# Patient Record
Sex: MALE | Race: WHITE | NOT HISPANIC OR LATINO | Employment: PART TIME | URBAN - NONMETROPOLITAN AREA
[De-identification: names, ages, dates, MRNs, and addresses within clinical notes are randomized per-mention and may not be internally consistent; named-entity substitution may affect disease eponyms.]

---
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
## 2023-02-10 ENCOUNTER — HOSPITAL ENCOUNTER (EMERGENCY)
Facility: HOSPITAL | Age: 23
Discharge: HOME | End: 2023-02-10
Attending: STUDENT IN AN ORGANIZED HEALTH CARE EDUCATION/TRAINING PROGRAM | Admitting: STUDENT IN AN ORGANIZED HEALTH CARE EDUCATION/TRAINING PROGRAM
Payer: COMMERCIAL

## 2023-02-10 ENCOUNTER — APPOINTMENT (OUTPATIENT)
Dept: CT IMAGING | Facility: HOSPITAL | Age: 23
End: 2023-02-10
Payer: COMMERCIAL

## 2023-02-10 VITALS
BODY MASS INDEX: 27.98 KG/M2 | HEIGHT: 77 IN | WEIGHT: 237 LBS | RESPIRATION RATE: 15 BRPM | DIASTOLIC BLOOD PRESSURE: 97 MMHG | SYSTOLIC BLOOD PRESSURE: 138 MMHG | OXYGEN SATURATION: 99 % | HEART RATE: 77 BPM | TEMPERATURE: 98.4 F

## 2023-02-10 DIAGNOSIS — M54.9 BACK PAIN, UNSPECIFIED BACK LOCATION, UNSPECIFIED BACK PAIN LATERALITY, UNSPECIFIED CHRONICITY: Primary | ICD-10-CM

## 2023-02-10 PROCEDURE — 96372 THER/PROPH/DIAG INJ SC/IM: CPT

## 2023-02-10 PROCEDURE — 99283 EMERGENCY DEPT VISIT LOW MDM: CPT

## 2023-02-10 PROCEDURE — 72128 CT CHEST SPINE W/O DYE: CPT

## 2023-02-10 PROCEDURE — 25010000002 KETOROLAC TROMETHAMINE PER 15 MG

## 2023-02-10 PROCEDURE — 72131 CT LUMBAR SPINE W/O DYE: CPT

## 2023-02-10 RX ORDER — KETOROLAC TROMETHAMINE 30 MG/ML
15 INJECTION, SOLUTION INTRAMUSCULAR; INTRAVENOUS ONCE
Status: COMPLETED | OUTPATIENT
Start: 2023-02-10 | End: 2023-02-10

## 2023-02-10 RX ORDER — METHOCARBAMOL 500 MG/1
750 TABLET, FILM COATED ORAL ONCE
Status: COMPLETED | OUTPATIENT
Start: 2023-02-10 | End: 2023-02-10

## 2023-02-10 RX ORDER — METHOCARBAMOL 500 MG/1
500 TABLET, FILM COATED ORAL 4 TIMES DAILY
Qty: 16 TABLET | Refills: 0 | Status: SHIPPED | OUTPATIENT
Start: 2023-02-10

## 2023-02-10 RX ORDER — LIDOCAINE 50 MG/G
1 PATCH TOPICAL EVERY 24 HOURS
Qty: 12 PATCH | Refills: 0 | Status: SHIPPED | OUTPATIENT
Start: 2023-02-10

## 2023-02-10 RX ORDER — LIDOCAINE 50 MG/G
1 PATCH TOPICAL ONCE
Status: DISCONTINUED | OUTPATIENT
Start: 2023-02-10 | End: 2023-02-10 | Stop reason: HOSPADM

## 2023-02-10 RX ADMIN — LIDOCAINE 1 PATCH: 50 PATCH CUTANEOUS at 11:56

## 2023-02-10 RX ADMIN — KETOROLAC TROMETHAMINE 15 MG: 30 INJECTION, SOLUTION INTRAMUSCULAR; INTRAVENOUS at 11:56

## 2023-02-10 RX ADMIN — METHOCARBAMOL 750 MG: 500 TABLET ORAL at 11:56

## 2023-02-10 NOTE — DISCHARGE INSTRUCTIONS
Return to emergency room for any worsening symptoms, I have sent lidocaine patches and muscle relaxer to your pharmacy, make sure to take these as directed and as needed only.  Utilize anti-inflammatory therapy such as naproxen or ibuprofen in addition to Tylenol for further pain.  Try to continue your daily routines as normal.  Follow-up with primary care, have also included a referral to orthopedics for further evaluation, call the number to schedule an appointment.

## 2023-02-10 NOTE — ED PROVIDER NOTES
"Subjective  History of Present Illness:  22-year-old athletic male presents emergency room today for chief complaint of back pain.  Patient reports that he was in the weight room dead lifting when his back \"gave out\".  Patient reports that he was dead lifting 135 pounds straight leg when his back gave out, he went to the floor and was unable to get back up.  Reports pain is worse with sitting.  He did ambulate into the emergency room.  Describes pain as mid to lower back and midline.  Nonradiating.  No numbness or tingling.  Denies any urinary retention, urinary or fecal incontinence, or saddle anesthesias.  This occurred approximately 30 minutes prior to arrival.  Describes the pain as a pressure-like sensation and rates as a 10 out of 10, worse with movement or ambulation.      Nurses Notes reviewed and agree, including vitals, allergies, social history and prior medical history.     REVIEW OF SYSTEMS: All systems reviewed and not pertinent unless noted.  Review of Systems   Musculoskeletal: Positive for back pain.   All other systems reviewed and are negative.      No past medical history on file.    Allergies:    Patient has no known allergies.      No past surgical history on file.      Social History     Socioeconomic History   • Marital status: Single   Tobacco Use   • Smoking status: Unknown         No family history on file.    Objective  Physical Exam:  /96   Pulse 101   Temp 98.4 °F (36.9 °C)   Resp 18   Ht 195.6 cm (77\")   Wt 108 kg (237 lb)   SpO2 96%   BMI 28.10 kg/m²      Physical Exam  Vitals and nursing note reviewed.   Constitutional:       General: He is not in acute distress.     Appearance: Normal appearance. He is normal weight. He is not ill-appearing, toxic-appearing or diaphoretic.   HENT:      Head: Normocephalic and atraumatic.      Nose: Nose normal. No congestion or rhinorrhea.      Mouth/Throat:      Pharynx: Oropharynx is clear.   Eyes:      Extraocular Movements: " "Extraocular movements intact.   Cardiovascular:      Pulses: Normal pulses.      Comments: Appears well perfused  Pulmonary:      Effort: Pulmonary effort is normal. No respiratory distress.   Musculoskeletal:         General: Tenderness present. No swelling. Normal range of motion.      Cervical back: Normal range of motion and neck supple. No rigidity or tenderness.      Comments: Tenderness palpated to the mid/lower back and midline.  There is additionally some paraspinal tenderness in the lower thoracic/upper lumbar region.  No overlying ecchymosis or skin changes.   Skin:     General: Skin is warm and dry.      Capillary Refill: Capillary refill takes less than 2 seconds.   Neurological:      General: No focal deficit present.      Mental Status: He is alert and oriented to person, place, and time.   Psychiatric:         Mood and Affect: Mood normal.         Behavior: Behavior normal.         Thought Content: Thought content normal.         Judgment: Judgment normal.           Procedures    ED Course:    ED Course as of 02/10/23 1326   Fri Feb 10, 2023   1150 Will obtain CT lumbar spine, CT thoracic spine, give Robaxin, lidocaine patch, and Toradol for symptomatic treatment. [JR]      ED Course User Index  [JR] Arvind García PA-C       Lab Results (last 24 hours)     ** No results found for the last 24 hours. **           CT Thoracic Spine Without Contrast    Result Date: 2/10/2023  PROCEDURE: CT THORACIC SPINE WO CONTRAST-  HISTORY: mid back pain, back \"gave out\" deadlifting  TECHNIQUE: Thin section axial CT with sagittal and coronal reconstructions  FINDINGS: No fracture is present. Alignment is normal. No bony canal stenosis is seen. No obvious disc abnormalities are seen.      Impression: Negative CT evaluation of the thoracic spine for acute bony injury.    This study was performed with techniques to keep radiation doses as low as reasonably achievable (ALARA). Individualized dose reduction techniques " "using automated exposure control or adjustment of vA and/or kV according to the patient size were employed.  This report was signed and finalized on 2/10/2023 12:33 PM by Brendon Connolly MD.    CT Lumbar Spine Without Contrast    Result Date: 2/10/2023  PROCEDURE: CT LUMBAR SPINE WO CONTRAST-  HISTORY:  low back pain, back gave out deadlifting  TECHNIQUE: Thin section axial CT with sagittal and coronal reconstructions  FINDINGS: No fracture is present. Alignment is normal.  There iscanal stenosis L3-4 and more significantly L4-5 due to combination of disc disease and congenitally small bony spinal canal.      Impression: 1. Negative CT evaluation of the lumbar spine for acute bony injury. 2. Canal stenosis L4-5 and to lesser extent L3-4    This study was performed with techniques to keep radiation doses as low as reasonably achievable (ALARA). Individualized dose reduction techniques using automated exposure control or adjustment of vA and/or kV according to the patient size were employed.  This report was signed and finalized on 2/10/2023 12:32 PM by Brendon Connolly MD.         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  Patient reports that he was in the weight room dead lifting when his back \"gave out\".  Patient reports that he was dead lifting 135 pounds straight leg when his back gave out, he went to the floor and was unable to get back up.  Reports pain is worse with sitting.  He did ambulate into the emergency room.  Describes pain as mid to lower back and midline.  Nonradiating.  No numbness or tingling.  Denies any urinary retention, urinary or fecal incontinence, or saddle anesthesias.  This occurred approximately 30 minutes prior to arrival.  Describes the pain as a pressure-like sensation and rates as a 10 out of 10, worse with movement or ambulation.    DDX: includes but is not limited to: Musculoskeletal " strain/sprain, osseous abnormality, fracture, bulging disc    Patient arrives hemodynamically stable, some hypertension at 141/96, afebrile, heart rate of 101, SPO2 96% on room air, 18 respirations a minute, nontoxic-appearing.  Suspect the elevated blood pressure and mild tachycardia is likely a response to pain.  With vitals interpreted by myself.     Pertinent features from physical exam: Pain palpated to the lower thoracic/upper lumbar region of the back and midline, some paraspinal tenderness also palpated in this region.  Patient has full range of motion, was able to ambulate into the emergency room.    Initial diagnostic plan: CT lumbar spine, CT thoracic spine without contrast    Results from initial plan were reviewed and interpreted by me revealing no acute abnormality of CT thoracic or lumbar spine. Negative CT evaluation of the lumbar spine for acute bony injury. Canal stenosis L4-5 and to lesser extent L3-4 as interpreted by radiology    Diagnostic information from other sources: N/A    Interventions / Re-evaluation: Given Toradol, lidocaine patch, and muscle laxer for symptomatic treatment.  Stable for discharge home.    Results/clinical rationale were discussed with patient at bedside.  Discussed supportive care measures such as anti-inflammatory therapy and Robaxin and lidocaine patches for further relief of pain.  Discussed sending referral for orthopedics if needed, patient will schedule appointment.  Discussed primary care follow-up as well.  Discussed negative CT findings with the patient for any acute bony abnormalities, did discuss canal stenosis as interpreted by radiology.  Return precautions discussed.  Discussed with him that in the absence of any bony abnormalities, this was likely the result of musculoskeletal etiology being a muscle strain of the mid to low back.    Consultations/Discussion of results with other physicians: N/A    Disposition plan: Stable for disposition home.  Placed a  referral for orthopedics and primary care follow-up.  Discussed supportive care measures such as anti-inflammatory and muscle relaxer and lidocaine patches in addition to ice and heat alternation as needed.  Return precautions discussed.  -----    Final diagnoses:   Back pain, unspecified back location, unspecified back pain laterality, unspecified chronicity        Arvind García PA-C  02/10/23 2495

## 2025-05-07 LAB
C TRACH RRNA SPEC QL NAA+PROBE: NEGATIVE
N GONORRHOEA RRNA SPEC QL NAA+PROBE: NEGATIVE

## 2025-05-08 PROBLEM — Z00.00 PHYSICAL EXAM: Status: ACTIVE | Noted: 2025-05-08

## 2025-05-08 NOTE — PROGRESS NOTES
Patient Name: Stephon Durham  : 2000   MRN: [id number]     Chief Complaint:    Chief Complaint   Patient presents with    Annual Exam    adjustment disorder        History of Present Illness: Stephon Durham is a 24 y.o. male who is here today for their annual health maintenance and physical.  And to go over abdominal pain and adjustment disorder.         Review of Systems:   Review of Systems   Constitutional: Negative.    HENT: Negative.     Eyes: Negative.    Respiratory: Negative.     Cardiovascular: Negative.    Gastrointestinal: Negative.  Positive for abdominal pain.   Neurological: Negative.        Past Medical History, Social History, Family History and Care Team were all reviewed with patient and updated as appropriate.     Medications:     Current Outpatient Medications:     sertraline (Zoloft) 25 MG tablet, Take 1 tablet by mouth Daily., Disp: 90 tablet, Rfl: 1    Allergies:   No Known Allergies    Health Maintenance   Topic Date Due    COVID-19 Vaccine (2024- season) Never done    HEPATITIS C SCREENING  Never done    INFLUENZA VACCINE  2025    ANNUAL PHYSICAL  2026    TDAP/TD VACCINES (2 - Td or Tdap) 2028    Pneumococcal Vaccine 0-49  Aged Out        PHQ-2 Total Score: 6       Intimate partner violence: (Screen on initial visit, older adult with injury or evidence of neglect):  Violence can be a problem in many people's lives, so I now ask every patient about trauma or abuse they may have experienced in a relationship.  Stress/Safety - Do you feel safe in your relationship?  Afraid/Abused - Have you ever been in a relationship where you were threatened, hurt, or afraid?  Friend/Family - Are your friends aware you have been hurt?  Emergency Plan - Do you have a safe place to go and the resources you need in an emergency?    Osteoporosis:   Men: history of low trauma fracture, androgen deprivation therapy for prostate cancer, hypogonadism, primary hyperparathyroidism,  "intestinal disorders.       Physical Exam:  Vital Signs:   Vitals:    05/12/25 1336   BP: 120/92   BP Location: Left arm   Patient Position: Sitting   Cuff Size: Large Adult   Pulse: 68   SpO2: 98%   Weight: 118 kg (261 lb 1.6 oz)   Height: 195.6 cm (77.01\")   PainSc: 0-No pain     Body mass index is 30.96 kg/m².     Physical Exam  Vitals and nursing note reviewed.   Constitutional:       Appearance: Normal appearance. He is normal weight.   HENT:      Head: Normocephalic and atraumatic.      Right Ear: Tympanic membrane, ear canal and external ear normal.      Left Ear: Tympanic membrane, ear canal and external ear normal.      Nose: Nose normal.      Mouth/Throat:      Mouth: Mucous membranes are moist.      Pharynx: Oropharynx is clear.   Eyes:      Extraocular Movements: Extraocular movements intact.      Conjunctiva/sclera: Conjunctivae normal.      Pupils: Pupils are equal, round, and reactive to light.   Cardiovascular:      Rate and Rhythm: Normal rate and regular rhythm.      Pulses: Normal pulses.      Heart sounds: Normal heart sounds.   Pulmonary:      Effort: Pulmonary effort is normal.      Breath sounds: Normal breath sounds.   Abdominal:      General: Abdomen is flat. Bowel sounds are normal.      Palpations: Abdomen is soft.   Musculoskeletal:         General: Normal range of motion.      Cervical back: Normal range of motion and neck supple.   Feet:      Comments:      Skin:     General: Skin is warm and dry.   Neurological:      General: No focal deficit present.      Mental Status: He is alert and oriented to person, place, and time.   Psychiatric:         Mood and Affect: Mood normal.         Behavior: Behavior normal.         Thought Content: Thought content normal.         Judgment: Judgment normal.         Procedures      Assessment/Plan:   Diagnoses and all orders for this visit:    1. Physical exam (Primary)  Assessment & Plan:  We did health maintenance, diet, exercise, and " immunizations.    Orders:  -     Comprehensive Metabolic Panel; Future  -     Hemoglobin A1c; Future  -     Lipid Panel; Future  -     CBC & Differential; Future  -     Vitamin D,25-Hydroxy; Future  -     TSH Rfx On Abnormal To Free T4; Future  -     Vitamin B12; Future    2. Adjustment disorder with depressed mood  Assessment & Plan:  Patient is single, lives in [place], and has nothing to do.  He is going to look for a new job.  I told him that we can start some Zoloft have him gradually increase it up to a therapeutic dose.  Will recheck in 2 months.    Orders:  -     Comprehensive Metabolic Panel; Future  -     Hemoglobin A1c; Future  -     Lipid Panel; Future  -     CBC & Differential; Future  -     Vitamin D,25-Hydroxy; Future  -     TSH Rfx On Abnormal To Free T4; Future  -     Vitamin B12; Future    3. Morbid (severe) obesity due to excess calories  Assessment & Plan:  Patient's (Body mass index is 30.96 kg/m².) indicates that they are obese (BMI >30) with health conditions that include hypertension . Weight is newly identified. BMI  is above average; BMI management plan is completed. We discussed portion control and increasing exercise.     Orders:  -     Comprehensive Metabolic Panel; Future  -     Hemoglobin A1c; Future  -     Lipid Panel; Future  -     CBC & Differential; Future  -     Vitamin D,25-Hydroxy; Future  -     TSH Rfx On Abnormal To Free T4; Future  -     Vitamin B12; Future    4. Elevated blood pressure reading  Assessment & Plan:  Patient will monitor his blood pressure.  We will recheck in 2 months    Orders:  -     Comprehensive Metabolic Panel; Future  -     Hemoglobin A1c; Future  -     Lipid Panel; Future  -     CBC & Differential; Future  -     Vitamin D,25-Hydroxy; Future  -     TSH Rfx On Abnormal To Free T4; Future  -     Vitamin B12; Future    5. Right upper quadrant abdominal pain  Assessment & Plan:  Patient burning pain in his right upper quadrant that is intermittent in  nature.  No nausea.  I am going to check a lipase, CBC, CMP, and get gallbladder studies.  Return to clinic in 2 months.    Orders:  -     US Gallbladder; Future  -     NM HIDA Scan With Pharmacological Intervention; Future  -     Lipase; Future    Other orders  -     sertraline (Zoloft) 25 MG tablet; Take 1 tablet by mouth Daily.  Dispense: 90 tablet; Refill: 1               Follow Up:   Return in about 2 months (around 7/12/2025) for Annual physical.    Healthcare Maintenance:   Counseling provided on Immunizations and JOSE Durham voices understanding and acceptance of this advice and will call back with any further questions or concerns. AVS with preventive healthcare tips printed for patient.     Terrance Suazo MD  [facility] [place]

## 2025-05-12 ENCOUNTER — OFFICE VISIT (OUTPATIENT)
Dept: FAMILY MEDICINE CLINIC | Facility: CLINIC | Age: 25
End: 2025-05-12
Payer: COMMERCIAL

## 2025-05-12 VITALS
SYSTOLIC BLOOD PRESSURE: 120 MMHG | WEIGHT: 261.1 LBS | HEIGHT: 77 IN | OXYGEN SATURATION: 98 % | BODY MASS INDEX: 30.83 KG/M2 | DIASTOLIC BLOOD PRESSURE: 92 MMHG | HEART RATE: 68 BPM

## 2025-05-12 DIAGNOSIS — E66.01 MORBID (SEVERE) OBESITY DUE TO EXCESS CALORIES: ICD-10-CM

## 2025-05-12 DIAGNOSIS — R10.11 RIGHT UPPER QUADRANT ABDOMINAL PAIN: ICD-10-CM

## 2025-05-12 DIAGNOSIS — R03.0 ELEVATED BLOOD PRESSURE READING: ICD-10-CM

## 2025-05-12 DIAGNOSIS — F43.21 ADJUSTMENT DISORDER WITH DEPRESSED MOOD: ICD-10-CM

## 2025-05-12 DIAGNOSIS — Z00.00 PHYSICAL EXAM: Primary | ICD-10-CM

## 2025-05-12 PROCEDURE — 99214 OFFICE O/P EST MOD 30 MIN: CPT | Performed by: FAMILY MEDICINE

## 2025-05-12 PROCEDURE — 99395 PREV VISIT EST AGE 18-39: CPT | Performed by: FAMILY MEDICINE

## 2025-05-12 RX ORDER — SERTRALINE HYDROCHLORIDE 25 MG/1
25 TABLET, FILM COATED ORAL DAILY
Qty: 90 TABLET | Refills: 1 | Status: SHIPPED | OUTPATIENT
Start: 2025-05-12

## 2025-05-12 NOTE — ASSESSMENT & PLAN NOTE
Patient burning pain in his right upper quadrant that is intermittent in nature.  No nausea.  I am going to check a lipase, CBC, CMP, and get gallbladder studies.  Return to clinic in 2 months.

## 2025-05-12 NOTE — ASSESSMENT & PLAN NOTE
Patient is single, lives in [place], and has nothing to do.  He is going to look for a new job.  I told him that we can start some Zoloft have him gradually increase it up to a therapeutic dose.  Will recheck in 2 months.

## 2025-05-12 NOTE — ASSESSMENT & PLAN NOTE
Patient's (Body mass index is 30.96 kg/m².) indicates that they are obese (BMI >30) with health conditions that include hypertension . Weight is newly identified. BMI  is above average; BMI management plan is completed. We discussed portion control and increasing exercise.

## 2025-06-19 ENCOUNTER — TELEPHONE (OUTPATIENT)
Dept: FAMILY MEDICINE CLINIC | Facility: CLINIC | Age: 25
End: 2025-06-19
Payer: COMMERCIAL

## 2025-06-19 NOTE — TELEPHONE ENCOUNTER
HUB TO RELAY    PLEASE LET PATIENT KNOW HIS APPOINTMENT WITH DR. TIRADO ON 7/14 IS NEEDING TO BE RESCHEDULED. I LEFT PATIENT A VOICEMAIL MAKING HIM AWARE.     PLEASE RESCHEDULE PATIENT.